# Patient Record
Sex: FEMALE | Race: BLACK OR AFRICAN AMERICAN | NOT HISPANIC OR LATINO | Employment: UNEMPLOYED | URBAN - METROPOLITAN AREA
[De-identification: names, ages, dates, MRNs, and addresses within clinical notes are randomized per-mention and may not be internally consistent; named-entity substitution may affect disease eponyms.]

---

## 2023-11-30 ENCOUNTER — TELEPHONE (OUTPATIENT)
Dept: ORTHOPEDICS | Facility: CLINIC | Age: 38
End: 2023-11-30
Payer: MEDICAID

## 2023-11-30 NOTE — TELEPHONE ENCOUNTER
----- Message from Steffi Banks sent at 11/30/2023  3:30 PM CST -----  Contact: Anusha Winn office-FAX  11/30/23    Referral scanned into media mgr, dx R%@-back pain, please call .[phone] (home)     Thanks    Steffi BUENO

## 2023-11-30 NOTE — TELEPHONE ENCOUNTER
Spoke with patient. Pt advise that we dont see back, neck or spine. Pt advise to call [facility]